# Patient Record
Sex: MALE | Race: WHITE | Employment: FULL TIME | URBAN - METROPOLITAN AREA
[De-identification: names, ages, dates, MRNs, and addresses within clinical notes are randomized per-mention and may not be internally consistent; named-entity substitution may affect disease eponyms.]

---

## 2021-12-08 ENCOUNTER — HOSPITAL ENCOUNTER (OUTPATIENT)
Dept: GENERAL RADIOLOGY | Age: 36
Discharge: HOME | End: 2021-12-08
Attending: INTERNAL MEDICINE
Payer: COMMERCIAL

## 2021-12-08 DIAGNOSIS — R05.1 ACUTE COUGH: ICD-10-CM

## 2021-12-08 DIAGNOSIS — U07.1 INFECTION DUE TO 2019-NCOV: ICD-10-CM

## 2021-12-08 PROCEDURE — 71046 X-RAY EXAM CHEST 2 VIEWS: CPT | Performed by: INTERNAL MEDICINE

## 2024-12-29 ENCOUNTER — HOSPITAL ENCOUNTER (OUTPATIENT)
Dept: CT IMAGING | Age: 39
Discharge: HOME | End: 2024-12-29
Attending: FAMILY MEDICINE
Payer: COMMERCIAL

## 2024-12-29 ENCOUNTER — HOSPITAL ENCOUNTER (OUTPATIENT)
Dept: CT IMAGING | Age: 39
End: 2024-12-29
Attending: FAMILY MEDICINE
Payer: COMMERCIAL

## 2024-12-29 DIAGNOSIS — R05.3 CHRONIC COUGH: ICD-10-CM

## 2024-12-29 PROCEDURE — 71250 CT THORAX DX C-: CPT | Performed by: FAMILY MEDICINE

## 2025-02-06 ENCOUNTER — HOSPITAL ENCOUNTER (OUTPATIENT)
Dept: CT IMAGING | Age: 40
Discharge: HOME | End: 2025-02-06
Attending: FAMILY MEDICINE

## 2025-02-06 VITALS
BODY MASS INDEX: 21.82 KG/M2 | HEIGHT: 74 IN | SYSTOLIC BLOOD PRESSURE: 130 MMHG | DIASTOLIC BLOOD PRESSURE: 76 MMHG | WEIGHT: 170 LBS

## 2025-02-06 DIAGNOSIS — Z13.9 SPECIAL SCREENING: ICD-10-CM

## 2025-02-06 NOTE — PROGRESS NOTES
Date of Service 2/6/2025    MAGED SCHAEFER  Date of Birth 8/12/1985    Patient Age: 39 year old    Ishmael Sanchez     Heart Scan Consult  Preliminary Heart Scan Score: 0    Previous Screening  Heart Scan Completed Previously: No        Peripheral Vascular Scan Completed Previously: No          Risk Factors  Personal Risk Factors  Non-alterable Risk Factors: Age  Alterable Risk Factors: Abnormal Cholesterol;Lack of exercise;Stress      Body Mass Index  Body mass index is 21.83 kg/m².    Blood Pressure     /76     (Normal =< 120/80,  Elevated = 120-129/ >80,  High Stage1 130-139/80-89 , Stage2 >140/>90)    Lipid Profile  No Lipid results on file in last 5 years .  Will come back to o fasting blood work     Cholesterol Goals  Value   Total  =< 200   HDL  = > 45 Men = > 55 Women   LDL   =< 100   Triglycerides  =< 150       Glucose and Hemoglobin A1C  No results found for: \"PGLU\", \"GLU\", \"A1C\"  (Normal Fasting Glucose < 100mg/dl )    Nurse Review  Risk factor information and results reviewed with Nurse: Yes    Recommended Follow Up:  Consult your physician regarding:: Final Heart Scan Report;Discuss potential for Incidental Finding;Discuss Potential for Score Variance      Recommendations for Change:  Nutrition Changes: Low Saturated Fat;Low Fat Dairy;Low Salt Eating;Increase Fiber    Cholesterol Modification (goal of therapy depends upon your risk):  (just ate large buffet would like to do a fasting cholesterol will come back for cholesterol test)    Exercise: Enhance Current Program         Weight Management: Maintain Current Weight    Stress Management: Adopt Stress Management Techniques    Repeat Heart Scan: 5 years if Calcium Score is 0.0;Discuss with your Physician              [place] Recommended Resources:  Recommended Resources: Upcoming Classes, Medical Services and Health Library [email or web address]            Arlene GUO RN        Please Contact the Nurse Heart Line with any Questions or Concerns  [phone].

## 2025-02-20 LAB
HDL POC: 39 MG/DL (ref 40–60)
LDL POC: 138 MG/DL (ref 0–130)
TC/HDL RATIO: 5 (ref 0–5)
TOTAL CHOLESTEROL POC: 201 MG/DL (ref 0–200)
TRIGLYCERIDES POC: 124 MG/DL (ref 0–200)